# Patient Record
Sex: FEMALE | Race: BLACK OR AFRICAN AMERICAN | NOT HISPANIC OR LATINO | URBAN - METROPOLITAN AREA
[De-identification: names, ages, dates, MRNs, and addresses within clinical notes are randomized per-mention and may not be internally consistent; named-entity substitution may affect disease eponyms.]

---

## 2017-06-08 ENCOUNTER — OUTPATIENT (OUTPATIENT)
Dept: OUTPATIENT SERVICES | Facility: HOSPITAL | Age: 67
LOS: 1 days | Discharge: HOME | End: 2017-06-08

## 2017-06-08 ENCOUNTER — APPOINTMENT (OUTPATIENT)
Dept: RADIOLOGY | Facility: HOSPITAL | Age: 67
End: 2017-06-08

## 2017-06-08 ENCOUNTER — APPOINTMENT (OUTPATIENT)
Dept: SPEECH THERAPY | Facility: HOSPITAL | Age: 67
End: 2017-06-08

## 2017-06-08 ENCOUNTER — OUTPATIENT (OUTPATIENT)
Dept: OUTPATIENT SERVICES | Facility: HOSPITAL | Age: 67
LOS: 1 days | End: 2017-06-08
Payer: MEDICAID

## 2017-06-08 DIAGNOSIS — R13.10 DYSPHAGIA, UNSPECIFIED: ICD-10-CM

## 2017-06-08 PROCEDURE — 74230 X-RAY XM SWLNG FUNCJ C+: CPT | Mod: 26

## 2017-06-19 DIAGNOSIS — R13.12 DYSPHAGIA, OROPHARYNGEAL PHASE: ICD-10-CM

## 2018-11-27 ENCOUNTER — APPOINTMENT (OUTPATIENT)
Dept: CARDIOLOGY | Facility: CLINIC | Age: 68
End: 2018-11-27
Payer: MEDICAID

## 2018-11-27 ENCOUNTER — NON-APPOINTMENT (OUTPATIENT)
Age: 68
End: 2018-11-27

## 2018-11-27 VITALS
SYSTOLIC BLOOD PRESSURE: 130 MMHG | BODY MASS INDEX: 25.95 KG/M2 | HEIGHT: 64 IN | OXYGEN SATURATION: 97 % | HEART RATE: 78 BPM | WEIGHT: 152 LBS | DIASTOLIC BLOOD PRESSURE: 64 MMHG

## 2018-11-27 DIAGNOSIS — I61.3 NONTRAUMATIC INTRACEREBRAL HEMORRHAGE IN BRAIN STEM: ICD-10-CM

## 2018-11-27 PROCEDURE — 93000 ELECTROCARDIOGRAM COMPLETE: CPT

## 2018-11-27 PROCEDURE — 99205 OFFICE O/P NEW HI 60 MIN: CPT

## 2018-11-27 NOTE — DISCUSSION/SUMMARY
[Non-specific ECG Changes] : abnormal ECG [Stable] : stable [ECG Normal Variant] : ECG normal variant [Myocardial Ischemia] : myocardial ischemia [Stress Test Treadmill] : an exercise treadmill test [Isotope Perfusion Sestamibi] : cardiac perfusion imaging with sestamibi [Hyperlipidemia] : hyperlipidemia [Medication Changes Per Orders] : as documented in orders [Diet Modification] : diet modification [Exercise] : exercise [Hypertension] : hypertension [Responding to Treatment] : responding to treatment [None] : none [Exercise Regimen] : an exercise regimen [Weight Loss] : weight loss [Patient] : the patient [Minutes spent___] : for [unfilled] ~Uminutes [___ Month(s)] : [unfilled] month(s) [de-identified] : new dx. [de-identified] : Bring .log of measured BP's at home/work. [FreeTextEntry1] : At the present time, blood pressure appears under control. If there is any documented evidence of CVA, patient should be treated with statin medication, it is unclear why she is not on a statin anymore. Patient states he had an echocardiogram done by PCP recently, we'll try to obtain copy as well as recent labs. She is on multiple blood pressure medications and may benefit from discontinuation of hydralazine and changing to losartan/hydrochlorothiazide instead. She had been on carvedilol in the past, it is unclear why this was discontinued as well. Will request copies of records from neurology to see whether or not patient had evaluation including ALLEN and whether or not she needs further evaluation for occult dysrhythmia.

## 2018-11-27 NOTE — HISTORY OF PRESENT ILLNESS
[FreeTextEntry1] : 67 y/o female with PMH: CVA, Hyperlipidemia, HTN, Muscle Weakness, Hemiplegia and Hemiparesis (left side), Kidney failure, UTI, DM, Dysphagia. \par No records available for review. 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 Since then she has recovered most of her functional capacity is ambulatory but has some mild gait imbalance. Walks with use of walker but not consistently. She does not recall which medications she is taking. Her blood pressure measuring device is not functional shows she is not assessing her blood pressure at home. She denies any recent chest pain, shortness of breath, visual field deficits, other neurological complaints. [facility] however it is unclear whether or not a complete evaluation was done as to possible source of CVA.

## 2018-11-27 NOTE — PHYSICAL EXAM
[General Appearance - Well Developed] : well developed [Normal Appearance] : normal appearance [Well Groomed] : well groomed [General Appearance - Well Nourished] : well nourished [No Deformities] : no deformities [General Appearance - In No Acute Distress] : no acute distress [Normal Conjunctiva] : the conjunctiva exhibited no abnormalities [Eyelids - No Xanthelasma] : the eyelids demonstrated no xanthelasmas [Normal Oral Mucosa] : normal oral mucosa [No Oral Pallor] : no oral pallor [No Oral Cyanosis] : no oral cyanosis [Normal Jugular Venous A Waves Present] : normal jugular venous A waves present [Normal Jugular Venous V Waves Present] : normal jugular venous V waves present [No Jugular Venous Wilson A Waves] : no jugular venous wilson A waves [Respiration, Rhythm And Depth] : normal respiratory rhythm and effort [Exaggerated Use Of Accessory Muscles For Inspiration] : no accessory muscle use [Auscultation Breath Sounds / Voice Sounds] : lungs were clear to auscultation bilaterally [Heart Rate And Rhythm] : heart rate and rhythm were normal [Heart Sounds] : normal S1 and S2 [Murmurs] : no murmurs present [Abdomen Soft] : soft [Abdomen Tenderness] : non-tender [Abdomen Mass (___ Cm)] : no abdominal mass palpated [Abnormal Walk] : normal gait [Gait - Sufficient For Exercise Testing] : the gait was sufficient for exercise testing [Nail Clubbing] : no clubbing of the fingernails [Cyanosis, Localized] : no localized cyanosis [Petechial Hemorrhages (___cm)] : no petechial hemorrhages [Skin Color & Pigmentation] : normal skin color and pigmentation [] : no rash [No Venous Stasis] : no venous stasis [Skin Lesions] : no skin lesions [No Skin Ulcers] : no skin ulcer [No Xanthoma] : no  xanthoma was observed [Oriented To Time, Place, And Person] : oriented to person, place, and time [Affect] : the affect was normal [Mood] : the mood was normal [No Anxiety] : not feeling anxious

## 2019-02-26 ENCOUNTER — APPOINTMENT (OUTPATIENT)
Dept: CARDIOLOGY | Facility: CLINIC | Age: 69
End: 2019-02-26
Payer: MEDICAID

## 2019-02-26 ENCOUNTER — NON-APPOINTMENT (OUTPATIENT)
Age: 69
End: 2019-02-26

## 2019-02-26 VITALS
OXYGEN SATURATION: 98 % | WEIGHT: 154 LBS | BODY MASS INDEX: 26.29 KG/M2 | HEART RATE: 79 BPM | DIASTOLIC BLOOD PRESSURE: 80 MMHG | HEIGHT: 64 IN | SYSTOLIC BLOOD PRESSURE: 130 MMHG

## 2019-02-26 PROCEDURE — 93000 ELECTROCARDIOGRAM COMPLETE: CPT

## 2019-02-26 PROCEDURE — 99214 OFFICE O/P EST MOD 30 MIN: CPT

## 2019-02-26 RX ORDER — UBIDECARENONE/VIT E ACET 100MG-5
50 MCG CAPSULE ORAL
Qty: 90 | Refills: 3 | Status: ACTIVE | COMMUNITY
Start: 2019-02-26 | End: 1900-01-01

## 2019-02-26 RX ORDER — ASPIRIN 81 MG
81 TABLET, DELAYED RELEASE (ENTERIC COATED) ORAL
Refills: 0 | Status: ACTIVE | COMMUNITY

## 2019-02-26 NOTE — DISCUSSION/SUMMARY
[Non-specific ECG Changes] : abnormal ECG [Stable] : stable [ECG Normal Variant] : ECG normal variant [Myocardial Ischemia] : myocardial ischemia [Stress Test Treadmill] : an exercise treadmill test [Isotope Perfusion Sestamibi] : cardiac perfusion imaging with sestamibi [Hyperlipidemia] : hyperlipidemia [Medication Changes Per Orders] : as documented in orders [Diet Modification] : diet modification [Exercise] : exercise [Hypertension] : hypertension [Responding to Treatment] : responding to treatment [None] : none [Exercise Regimen] : an exercise regimen [Weight Loss] : weight loss [Patient] : the patient [Minutes spent___] : for [unfilled] ~Uminutes [___ Month(s)] : [unfilled] month(s) [de-identified] : new dx. [de-identified] : Bring .log of measured BP's at home/work. [FreeTextEntry1] : At the present time, blood pressure appears under control. \par Recent echo done at [facility] office, will request copies.\par Labs reviewed. Vit D deficient, Rx given.\par F/U in one year.

## 2019-02-26 NOTE — HISTORY OF PRESENT ILLNESS
[FreeTextEntry1] : 69 y/o female with PMH: CVA, Hyperlipidemia, HTN, Muscle Weakness, Hemiplegia and Hemiparesis (left side), Kidney failure, UTI, DM, Dysphagia. 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 Since then she has recovered most of her functional capacity is ambulatory but has some mild gait imbalance. Walks with use of walker but not consistently. \par \par She denies any recent chest pain, shortness of breath, visual field deficits, other neurological complaints. Followed by Dr. Mak [facility] for her neurology followup however it is unclear whether or not a complete evaluation was done as to possible source of CVA.

## 2019-02-26 NOTE — CARDIOLOGY SUMMARY
[Normal] : normal [No Ischemia] : no Ischemia [No Exercise Ind Arr] : no exercise induced arrhythmias [No Symptoms] : no Symptoms [___] : [unfilled] [LVEF ___%] : LVEF [unfilled]% [None] : no pulmonary hypertension

## 2021-05-18 ENCOUNTER — APPOINTMENT (OUTPATIENT)
Dept: DISASTER EMERGENCY | Facility: OTHER | Age: 71
End: 2021-05-18
Payer: MEDICARE

## 2021-05-18 PROCEDURE — 0012A: CPT

## 2023-01-10 ENCOUNTER — APPOINTMENT (OUTPATIENT)
Dept: CARDIOLOGY | Facility: CLINIC | Age: 73
End: 2023-01-10

## 2023-03-31 VITALS
HEIGHT: 64 IN | DIASTOLIC BLOOD PRESSURE: 80 MMHG | HEART RATE: 85 BPM | WEIGHT: 250 LBS | SYSTOLIC BLOOD PRESSURE: 140 MMHG | BODY MASS INDEX: 42.68 KG/M2 | OXYGEN SATURATION: 96 %

## 2023-05-09 ENCOUNTER — APPOINTMENT (OUTPATIENT)
Dept: CARDIOLOGY | Facility: CLINIC | Age: 73
End: 2023-05-09

## 2023-05-09 ENCOUNTER — APPOINTMENT (OUTPATIENT)
Dept: CARDIOLOGY | Facility: CLINIC | Age: 73
End: 2023-05-09
Payer: MEDICARE

## 2023-05-09 ENCOUNTER — NON-APPOINTMENT (OUTPATIENT)
Age: 73
End: 2023-05-09

## 2023-05-09 VITALS
SYSTOLIC BLOOD PRESSURE: 160 MMHG | HEIGHT: 64 IN | BODY MASS INDEX: 31.24 KG/M2 | OXYGEN SATURATION: 98 % | WEIGHT: 183 LBS | DIASTOLIC BLOOD PRESSURE: 90 MMHG | HEART RATE: 81 BPM

## 2023-05-09 VITALS — DIASTOLIC BLOOD PRESSURE: 90 MMHG | SYSTOLIC BLOOD PRESSURE: 156 MMHG

## 2023-05-09 DIAGNOSIS — R94.31 ABNORMAL ELECTROCARDIOGRAM [ECG] [EKG]: ICD-10-CM

## 2023-05-09 DIAGNOSIS — I63.9 CEREBRAL INFARCTION, UNSPECIFIED: ICD-10-CM

## 2023-05-09 DIAGNOSIS — I69.359 HEMIPLEGIA AND HEMIPARESIS FOLLOWING CEREBRAL INFARCTION AFFECTING UNSPECIFIED SIDE: ICD-10-CM

## 2023-05-09 PROCEDURE — 93306 TTE W/DOPPLER COMPLETE: CPT

## 2023-05-09 PROCEDURE — 93000 ELECTROCARDIOGRAM COMPLETE: CPT

## 2023-05-09 PROCEDURE — 99204 OFFICE O/P NEW MOD 45 MIN: CPT

## 2023-05-09 NOTE — CARDIOLOGY SUMMARY
[Normal] : normal [No Ischemia] : no Ischemia [No Exercise Ind Arr] : no exercise induced arrhythmias [No Symptoms] : no Symptoms [___] : [unfilled] [LVEF ___%] : LVEF [unfilled]% [None] : no pulmonary hypertension [de-identified] : 5/9/23,\par Sinus  Rhythm \par Voltage criteria for LVH  (R(I)+S(III) exceeds 2.50 mV)  -Voltage criteria w/o ST/T abnormality may be normal. \par  -consider old anterior infarct. \par  -Nonspecific ST depression  -Nondiagnostic.  [de-identified] : 8/11/14, no Ischemia, no exercise induced arrhythmias, no Symptoms  [de-identified] : 10/1/13, LVH. Mild AI, MR, TR, TR. Atrial septal aneurysm., no pulmonary hypertension LVEF 60%. \par

## 2023-05-09 NOTE — DISCUSSION/SUMMARY
[Non-specific ECG Changes] : abnormal ECG [Stable] : stable [ECG Normal Variant] : ECG normal variant [Myocardial Ischemia] : myocardial ischemia [Stress Test Treadmill] : an exercise treadmill test [Isotope Perfusion Sestamibi] : cardiac perfusion imaging with sestamibi [None] : There are no changes in medication management [Hyperlipidemia] : hyperlipidemia [Diet Modification] : diet modification [Exercise] : exercise [Hypertension] : hypertension [Responding to Treatment] : responding to treatment [Exercise Regimen] : an exercise regimen [Weight Loss] : weight loss [Patient] : the patient [Minutes Spent: ___] : for [unfilled] ~Uminutes [___ Month(s)] : in [unfilled] month(s) [de-identified] : new dx. [FreeTextEntry1] : 72-year-old female with history of CVA/hemorrhage.  Longstanding history of hypertension and hyperlipidemia.  Patient states she had been off her lipid medications for several years only recently started after her last blood test done at PCP in November, 2022 but has not had repeat labs done to assess efficacy of current Lipitor dosing.\par \par Blood pressures have been persistently elevated despite multiple hypertensive medications.  Patient states that she is completely compliant time and with her medications but self measured blood pressures remain in the 140s to 150s.  We will change losartan to losartan/HCTZ and patient scheduled for follow-up at PCP next month at which time she will have her blood pressures reassessed.  Will request actual bottle count #pill count and consider increasing hydralazine if needed for further blood pressure control.\par \par Transthoracic echocardiogram requested for hypertensive heart disease which is the likely cause of her EKG abnormalities.  Follow-up in 6 months or sooner if symptomatic.

## 2023-05-09 NOTE — HISTORY OF PRESENT ILLNESS
[FreeTextEntry1] : 73 y/o female with h/o CVA, Hyperlipidemia, HTN, Muscle Weakness, Hemiplegia and Hemiparesis (left side), Kidney failure, UTI, DM, Dysphagia. \par \par s/p  brainstem hemorrhage in January of 2017 and was in a coma for 3 months. Since then she has recovered most of her functional capacity is ambulatory but has some mild gait imbalance.  Able to ambulate without difficulty, states that she walks daily.\par \par She denies any recent chest pain, shortness of breath, visual field deficits, other neurological complaints. Followed by Dr. Mak [facility] for her neurology followup however it is unclear whether or not a complete evaluation was done as to possible source of CVA.

## 2023-11-10 ENCOUNTER — APPOINTMENT (OUTPATIENT)
Dept: CARDIOLOGY | Facility: CLINIC | Age: 73
End: 2023-11-10

## 2024-01-05 ENCOUNTER — APPOINTMENT (OUTPATIENT)
Dept: CARDIOLOGY | Facility: CLINIC | Age: 74
End: 2024-01-05

## 2024-01-18 NOTE — PHYSICAL EXAM
Continued Stay Note  [facility]     Patient Name: Antonette King  MRN: [id number]  Today's Date: 1/18/2024    Admit Date: 1/14/2024    Plan: Home w/ assist of family.  Referral to [facility] - pending.   Discharge Plan       Row Name 01/18/24 1123       Plan    Plan Home w/ assist of family.  Referral to [facility] - pending.    Patient/Family in Agreement with Plan yes    Provided Post Acute Provider List? Yes    Post Acute Provider List Home Health    Provided Post Acute Provider Quality & Resource List? Yes    Post Acute Provider Quality and Resource List Home Health    Delivered To Support Person    Method of Delivery In person    Plan Comments S/w pt and her son Arturo at bedside.  Her son Kasey was on speaker phone. They confirm plan is for pt to return home upon DC.  Family will assist pt at home as needed. They request home health for PT and nursing.  CCP discussed options and they have no preference of HH agency.  Referral sent to [facility] pending.  Palliative care met with pt and family this morning and will make a referral to [facility].  Compression stockings have been delivered by Joe.  Joe is following for a rolling walker..........Isa SULLIVAN/ [facility]                   Discharge Codes    No documentation.                 Expected Discharge Date and Time       Expected Discharge Date Expected Discharge Time    Jan 17, 2024               Isa Jorge RN     [Well Developed] : well developed [Well Nourished] : well nourished [No Acute Distress] : no acute distress [Normal Conjunctiva] : normal conjunctiva [Normal Venous Pressure] : normal venous pressure [No Carotid Bruit] : no carotid bruit [Normal S1, S2] : normal S1, S2 [No Murmur] : no murmur [No Rub] : no rub [No Gallop] : no gallop [Clear Lung Fields] : clear lung fields [Good Air Entry] : good air entry [No Respiratory Distress] : no respiratory distress  [Soft] : abdomen soft [Non Tender] : non-tender [No Masses/organomegaly] : no masses/organomegaly [Normal Bowel Sounds] : normal bowel sounds [Normal Gait] : normal gait [No Edema] : no edema [No Cyanosis] : no cyanosis [No Clubbing] : no clubbing [No Varicosities] : no varicosities [No Rash] : no rash [No Skin Lesions] : no skin lesions [Moves all extremities] : moves all extremities [No Focal Deficits] : no focal deficits [Normal Speech] : normal speech [Alert and Oriented] : alert and oriented [Normal memory] : normal memory

## 2024-04-12 ENCOUNTER — APPOINTMENT (OUTPATIENT)
Dept: CARDIOLOGY | Facility: CLINIC | Age: 74
End: 2024-04-12
Payer: MEDICARE

## 2024-04-12 ENCOUNTER — NON-APPOINTMENT (OUTPATIENT)
Age: 74
End: 2024-04-12

## 2024-04-12 VITALS
SYSTOLIC BLOOD PRESSURE: 150 MMHG | BODY MASS INDEX: 32.61 KG/M2 | HEIGHT: 64 IN | WEIGHT: 191 LBS | HEART RATE: 77 BPM | OXYGEN SATURATION: 98 % | DIASTOLIC BLOOD PRESSURE: 80 MMHG

## 2024-04-12 VITALS — DIASTOLIC BLOOD PRESSURE: 80 MMHG | SYSTOLIC BLOOD PRESSURE: 142 MMHG

## 2024-04-12 DIAGNOSIS — E78.5 HYPERLIPIDEMIA, UNSPECIFIED: ICD-10-CM

## 2024-04-12 DIAGNOSIS — I10 ESSENTIAL (PRIMARY) HYPERTENSION: ICD-10-CM

## 2024-04-12 PROCEDURE — G2211 COMPLEX E/M VISIT ADD ON: CPT

## 2024-04-12 PROCEDURE — 93000 ELECTROCARDIOGRAM COMPLETE: CPT

## 2024-04-12 PROCEDURE — 99214 OFFICE O/P EST MOD 30 MIN: CPT

## 2024-04-12 RX ORDER — HYDRALAZINE HYDROCHLORIDE 100 MG/1
100 TABLET ORAL
Qty: 270 | Refills: 3 | Status: ACTIVE | COMMUNITY
Start: 1900-01-01 | End: 1900-01-01

## 2024-04-12 RX ORDER — ATORVASTATIN CALCIUM 20 MG/1
20 TABLET, FILM COATED ORAL
Qty: 90 | Refills: 3 | Status: ACTIVE | COMMUNITY
Start: 1900-01-01 | End: 1900-01-01

## 2024-04-12 NOTE — HISTORY OF PRESENT ILLNESS
[FreeTextEntry1] : 72 y/o female with h/o CVA, Hyperlipidemia, HTN, Muscle Weakness, Hemiplegia and Hemiparesis (left side), Kidney failure, UTI, DM, Dysphagia.   s/p brainstem hemorrhage in January of 2017 and was in a coma for 3 months. Since then she has recovered most of her functional capacity is ambulatory but has some mild gait imbalance.  Able to ambulate without difficulty, states that she walks daily.  She denies any recent chest pain, shortness of breath, visual field deficits, other neurological complaints.  [facility]

## 2024-04-12 NOTE — DISCUSSION/SUMMARY
[Non-specific ECG Changes] : abnormal ECG [Stable] : stable [ECG Normal Variant] : ECG normal variant [Myocardial Ischemia] : myocardial ischemia [Stress Test Treadmill] : an exercise treadmill test [Isotope Perfusion Sestamibi] : cardiac perfusion imaging with sestamibi [None] : There are no changes in medication management [Hyperlipidemia] : hyperlipidemia [Diet Modification] : diet modification [Exercise] : exercise [Hypertension] : hypertension [Responding to Treatment] : responding to treatment [Exercise Regimen] : an exercise regimen [Weight Loss] : weight loss [Patient] : the patient [Minutes Spent: ___] : for [unfilled] ~Uminutes [___ Month(s)] : in [unfilled] month(s) [EKG obtained to assist in diagnosis and management of assessed problem(s)] : EKG obtained to assist in diagnosis and management of assessed problem(s) [de-identified] : new dx. [FreeTextEntry1] : 73-year-old female with history of CVA/hemorrhage.   Longstanding history of hypertension and hyperlipidemia.    Blood pressures have been persistently elevated despite multiple hypertensive medications.  Increase Hydralazine to 100 mg TID and increased Atorvastatin to 20 mg daily (LDL>100 with h/o CVA).  Increase activity, needs weight loss. GUSTAVO evaluation?  f/u 6 months.

## 2024-04-12 NOTE — CARDIOLOGY SUMMARY
[Normal] : normal [No Ischemia] : no Ischemia [No Exercise Ind Arr] : no exercise induced arrhythmias [No Symptoms] : no Symptoms [___] : [unfilled] [LVEF ___%] : LVEF [unfilled]% [None] : no pulmonary hypertension [de-identified] : 54/12/24, Sinus Rhythm , Voltage criteria for LVH (R(I)+S(III) exceeds 2.50 mV) -Voltage criteria w/o ST/T abnormality may be normal. -consider old anterior infarct.  5/9/23,Sinus  Rhythm Voltage criteria for LVH  (R(I)+S(III) exceeds 2.50 mV)  -Voltage criteria w/o ST/T abnormality may be normal.   -consider old anterior infarct.  -Nonspecific ST depression  -Nondiagnostic.  [de-identified] : 8/11/14, no Ischemia, no exercise induced arrhythmias, no Symptoms  [de-identified] : 10/1/13, LVH. Mild AI, MR, TR, TR. Atrial septal aneurysm., no pulmonary hypertension LVEF 60%. \par

## 2024-06-02 ENCOUNTER — RX RENEWAL (OUTPATIENT)
Age: 74
End: 2024-06-02

## 2024-06-02 RX ORDER — LOSARTAN POTASSIUM AND HYDROCHLOROTHIAZIDE 25; 100 MG/1; MG/1
100-25 TABLET ORAL DAILY
Qty: 90 | Refills: 3 | Status: ACTIVE | COMMUNITY
Start: 2023-05-09 | End: 1900-01-01

## 2024-10-15 ENCOUNTER — NON-APPOINTMENT (OUTPATIENT)
Age: 74
End: 2024-10-15

## 2024-10-15 ENCOUNTER — APPOINTMENT (OUTPATIENT)
Dept: CARDIOLOGY | Facility: CLINIC | Age: 74
End: 2024-10-15
Payer: MEDICARE

## 2024-10-15 VITALS
SYSTOLIC BLOOD PRESSURE: 154 MMHG | HEART RATE: 83 BPM | BODY MASS INDEX: 31.76 KG/M2 | HEIGHT: 64 IN | DIASTOLIC BLOOD PRESSURE: 80 MMHG | WEIGHT: 186 LBS | OXYGEN SATURATION: 97 %

## 2024-10-15 VITALS — SYSTOLIC BLOOD PRESSURE: 150 MMHG | DIASTOLIC BLOOD PRESSURE: 80 MMHG

## 2024-10-15 DIAGNOSIS — R01.1 CARDIAC MURMUR, UNSPECIFIED: ICD-10-CM

## 2024-10-15 PROCEDURE — 93000 ELECTROCARDIOGRAM COMPLETE: CPT

## 2024-10-15 PROCEDURE — 99214 OFFICE O/P EST MOD 30 MIN: CPT

## 2024-10-15 PROCEDURE — G2211 COMPLEX E/M VISIT ADD ON: CPT

## 2024-10-22 ENCOUNTER — APPOINTMENT (OUTPATIENT)
Dept: CARDIOLOGY | Facility: CLINIC | Age: 74
End: 2024-10-22
Payer: MEDICARE

## 2024-10-22 PROCEDURE — 93306 TTE W/DOPPLER COMPLETE: CPT

## 2025-06-19 ENCOUNTER — RX RENEWAL (OUTPATIENT)
Age: 75
End: 2025-06-19

## 2025-08-29 ENCOUNTER — NON-APPOINTMENT (OUTPATIENT)
Age: 75
End: 2025-08-29

## 2025-08-29 ENCOUNTER — APPOINTMENT (OUTPATIENT)
Dept: CARDIOLOGY | Facility: CLINIC | Age: 75
End: 2025-08-29
Payer: MEDICARE

## 2025-08-29 VITALS
HEART RATE: 83 BPM | SYSTOLIC BLOOD PRESSURE: 160 MMHG | BODY MASS INDEX: 32.95 KG/M2 | HEIGHT: 64 IN | WEIGHT: 193 LBS | OXYGEN SATURATION: 98 % | DIASTOLIC BLOOD PRESSURE: 80 MMHG

## 2025-08-29 VITALS — SYSTOLIC BLOOD PRESSURE: 154 MMHG | DIASTOLIC BLOOD PRESSURE: 80 MMHG

## 2025-08-29 DIAGNOSIS — E78.5 HYPERLIPIDEMIA, UNSPECIFIED: ICD-10-CM

## 2025-08-29 DIAGNOSIS — I10 ESSENTIAL (PRIMARY) HYPERTENSION: ICD-10-CM

## 2025-08-29 PROCEDURE — 93000 ELECTROCARDIOGRAM COMPLETE: CPT

## 2025-08-29 PROCEDURE — 99214 OFFICE O/P EST MOD 30 MIN: CPT | Mod: 25

## 2025-08-29 RX ORDER — METOPROLOL SUCCINATE 25 MG/1
25 TABLET, EXTENDED RELEASE ORAL DAILY
Qty: 90 | Refills: 3 | Status: ACTIVE | COMMUNITY
Start: 2025-08-29 | End: 1900-01-01